# Patient Record
Sex: FEMALE | Race: OTHER | NOT HISPANIC OR LATINO
[De-identification: names, ages, dates, MRNs, and addresses within clinical notes are randomized per-mention and may not be internally consistent; named-entity substitution may affect disease eponyms.]

---

## 2018-04-09 PROBLEM — Z00.00 ENCOUNTER FOR PREVENTIVE HEALTH EXAMINATION: Status: ACTIVE | Noted: 2018-04-09

## 2018-05-29 ENCOUNTER — APPOINTMENT (OUTPATIENT)
Dept: UROLOGY | Facility: CLINIC | Age: 38
End: 2018-05-29

## 2018-08-20 ENCOUNTER — TRANSCRIPTION ENCOUNTER (OUTPATIENT)
Age: 38
End: 2018-08-20

## 2018-12-06 ENCOUNTER — APPOINTMENT (OUTPATIENT)
Dept: SURGERY | Facility: CLINIC | Age: 38
End: 2018-12-06
Payer: MEDICAID

## 2018-12-06 VITALS
WEIGHT: 115 LBS | BODY MASS INDEX: 19.16 KG/M2 | SYSTOLIC BLOOD PRESSURE: 128 MMHG | HEIGHT: 65 IN | DIASTOLIC BLOOD PRESSURE: 70 MMHG

## 2018-12-06 DIAGNOSIS — Z78.9 OTHER SPECIFIED HEALTH STATUS: ICD-10-CM

## 2018-12-06 DIAGNOSIS — Z80.3 FAMILY HISTORY OF MALIGNANT NEOPLASM OF BREAST: ICD-10-CM

## 2018-12-06 DIAGNOSIS — Z87.891 PERSONAL HISTORY OF NICOTINE DEPENDENCE: ICD-10-CM

## 2018-12-06 PROCEDURE — 99202 OFFICE O/P NEW SF 15 MIN: CPT

## 2018-12-07 PROBLEM — Z87.891 FORMER SMOKER: Status: ACTIVE | Noted: 2018-12-06

## 2018-12-07 PROBLEM — Z78.9 CAFFEINE USE: Status: ACTIVE | Noted: 2018-12-06

## 2018-12-07 PROBLEM — Z80.3 FAMILY HISTORY OF MALIGNANT NEOPLASM OF BREAST: Status: ACTIVE | Noted: 2018-12-06

## 2018-12-07 PROBLEM — Z78.9 SOCIAL ALCOHOL USE: Status: ACTIVE | Noted: 2018-12-06

## 2019-06-06 ENCOUNTER — APPOINTMENT (OUTPATIENT)
Dept: SURGERY | Facility: CLINIC | Age: 39
End: 2019-06-06
Payer: COMMERCIAL

## 2019-06-06 VITALS
HEIGHT: 65 IN | WEIGHT: 115 LBS | SYSTOLIC BLOOD PRESSURE: 112 MMHG | DIASTOLIC BLOOD PRESSURE: 64 MMHG | BODY MASS INDEX: 19.16 KG/M2

## 2019-06-06 DIAGNOSIS — N63.10 UNSPECIFIED LUMP IN THE RIGHT BREAST, UNSPECIFIED QUADRANT: ICD-10-CM

## 2019-06-06 DIAGNOSIS — N60.11 DIFFUSE CYSTIC MASTOPATHY OF RIGHT BREAST: ICD-10-CM

## 2019-06-06 PROCEDURE — 99212 OFFICE O/P EST SF 10 MIN: CPT

## 2019-06-06 NOTE — PLAN
[FreeTextEntry1] : Return to office in 6 months or sooner as needed.\par Bilateral mammogram to be done November 2019

## 2019-06-06 NOTE — PHYSICAL EXAM
[de-identified] : Asymmetry as before with right breast larger. No new masses or areas of suspicion noted bilaterally. Core biopsy site reaction in the right lateral breast has resolved. No axillary adenopathy bilaterally. [de-identified] : no adenopathy

## 2019-12-26 ENCOUNTER — APPOINTMENT (OUTPATIENT)
Dept: SURGERY | Facility: CLINIC | Age: 39
End: 2019-12-26

## 2021-02-26 ENCOUNTER — TRANSCRIPTION ENCOUNTER (OUTPATIENT)
Age: 41
End: 2021-02-26

## 2021-03-22 ENCOUNTER — TRANSCRIPTION ENCOUNTER (OUTPATIENT)
Age: 41
End: 2021-03-22

## 2022-04-29 ENCOUNTER — APPOINTMENT (OUTPATIENT)
Dept: NEUROLOGY | Facility: CLINIC | Age: 42
End: 2022-04-29

## 2023-02-28 ENCOUNTER — NON-APPOINTMENT (OUTPATIENT)
Age: 43
End: 2023-02-28

## 2023-02-28 ENCOUNTER — APPOINTMENT (OUTPATIENT)
Dept: OBGYN | Facility: CLINIC | Age: 43
End: 2023-02-28
Payer: COMMERCIAL

## 2023-02-28 VITALS
BODY MASS INDEX: 21.66 KG/M2 | SYSTOLIC BLOOD PRESSURE: 116 MMHG | HEIGHT: 65 IN | HEART RATE: 77 BPM | DIASTOLIC BLOOD PRESSURE: 82 MMHG | WEIGHT: 130 LBS

## 2023-02-28 DIAGNOSIS — Z87.19 PERSONAL HISTORY OF OTHER DISEASES OF THE DIGESTIVE SYSTEM: ICD-10-CM

## 2023-02-28 DIAGNOSIS — Z86.2 PERSONAL HISTORY OF DISEASES OF THE BLOOD AND BLOOD-FORMING ORGANS AND CERTAIN DISORDERS INVOLVING THE IMMUNE MECHANISM: ICD-10-CM

## 2023-02-28 PROCEDURE — 76830 TRANSVAGINAL US NON-OB: CPT

## 2023-02-28 PROCEDURE — 99203 OFFICE O/P NEW LOW 30 MIN: CPT | Mod: 25

## 2023-02-28 NOTE — PROCEDURE
[Transvaginal Ultrasound] : transvaginal ultrasound [FreeTextEntry3] : SNOWSTORM PATTERN, ADENOMYOSIS [FreeTextEntry5] : 116 CC VOLUME,  THICKENED ENDOMETRIUM 5 MM [FreeTextEntry7] : 3.4 CC [FreeTextEntry8] : 3.8 CC

## 2023-02-28 NOTE — HISTORY OF PRESENT ILLNESS
[[place]] : intercourse [Irregular Menstrual Interval] : irregular menstrual interval [Abnormal Quantity] : abnormal quantity [Abnormal Duration] : abnormal duration [Heavy Bleeding] : heavy bleeding [PGHxTotal] : 2 [[facility] [[facility] : 2 [FreeTextEntry1] : /...2 MARGARITA'S    AMARJIT AND  ARABELLA

## 2023-03-01 ENCOUNTER — NON-APPOINTMENT (OUTPATIENT)
Age: 43
End: 2023-03-01

## 2023-03-10 ENCOUNTER — NON-APPOINTMENT (OUTPATIENT)
Age: 43
End: 2023-03-10

## 2023-03-17 ENCOUNTER — NON-APPOINTMENT (OUTPATIENT)
Age: 43
End: 2023-03-17

## 2023-03-21 ENCOUNTER — APPOINTMENT (OUTPATIENT)
Dept: OBGYN | Facility: CLINIC | Age: 43
End: 2023-03-21
Payer: COMMERCIAL

## 2023-03-21 ENCOUNTER — NON-APPOINTMENT (OUTPATIENT)
Age: 43
End: 2023-03-21

## 2023-03-21 VITALS
TEMPERATURE: 97.9 F | WEIGHT: 130 LBS | HEIGHT: 65 IN | DIASTOLIC BLOOD PRESSURE: 81 MMHG | SYSTOLIC BLOOD PRESSURE: 115 MMHG | HEART RATE: 70 BPM | BODY MASS INDEX: 21.66 KG/M2

## 2023-03-21 DIAGNOSIS — Z01.818 ENCOUNTER FOR OTHER PREPROCEDURAL EXAMINATION: ICD-10-CM

## 2023-03-21 LAB
HCG UR QL: NEGATIVE
QUALITY CONTROL: YES

## 2023-03-21 PROCEDURE — 58558Z: CUSTOM

## 2023-03-21 PROCEDURE — 81025 URINE PREGNANCY TEST: CPT

## 2023-03-21 PROCEDURE — 58300 INSERT INTRAUTERINE DEVICE: CPT

## 2023-03-21 NOTE — PROCEDURE
[IUD Placement] : intrauterine device (IUD) placement [Pain] : pain [Expulsion] : expulsion [Failure] : failure [Uterine Perforation] : uterine perforation [Neg Pregnancy Test] : negative pregnancy test [Betadine] : Betadine [Tenaculum] : Tenaculum [Easy Passage] : Easy passage [Mirena IUD] : Mirena IUD [No Complications] : No complications [Hysteroscopy] : Hysteroscopy [Time out performed] : Pre-procedure time out performed.  Patient's name, date of birth and procedure confirmed. [Consent Obtained] : Consent obtained [Risks] : risks [Benefits] : benefits [Alternatives] : alternatives [Patient] : patient [Infection] : infection [Bleeding] : bleeding [Allergic Reaction] : allergic reaction [Sent to Pathology] : specimen was placed in buffered formalin and sent for pathology [Hemostasis obtained] : hemostasis obtained [Tolerated Well] : Patient tolerated the procedure well [Aftercare instructions/regstrictions given and follow-up scheduled] : Aftercare instructions/restrictions given and follow-up scheduled [Antibiotics given] : antibiotics not given [de-identified] : [id number] [de-identified] : 01/2025 [de-identified] : 2039 [de-identified] : first degree prolapse\par sounded to 10cm\par moderate polypoid tissue\par medium sized curette\par both ostia visualized\par MIRENA INSERTED

## 2023-03-22 ENCOUNTER — NON-APPOINTMENT (OUTPATIENT)
Age: 43
End: 2023-03-22

## 2023-03-25 LAB — CORE LAB BIOPSY: NORMAL

## 2023-03-27 ENCOUNTER — NON-APPOINTMENT (OUTPATIENT)
Age: 43
End: 2023-03-27

## 2023-04-04 ENCOUNTER — APPOINTMENT (OUTPATIENT)
Dept: OBGYN | Facility: CLINIC | Age: 43
End: 2023-04-04
Payer: COMMERCIAL

## 2023-04-04 VITALS
BODY MASS INDEX: 21.66 KG/M2 | HEART RATE: 76 BPM | DIASTOLIC BLOOD PRESSURE: 81 MMHG | WEIGHT: 130 LBS | SYSTOLIC BLOOD PRESSURE: 112 MMHG | HEIGHT: 65 IN

## 2023-04-04 PROCEDURE — 76830 TRANSVAGINAL US NON-OB: CPT

## 2023-04-04 PROCEDURE — 99213 OFFICE O/P EST LOW 20 MIN: CPT | Mod: 25

## 2023-04-04 NOTE — REVIEW OF SYSTEMS
[Fatigue] : fatigue [Recent Weight Gain (___ Lbs)] : recent [unfilled] ~Ulb weight gain [Nausea] : nausea [Negative] : Breast

## 2023-04-04 NOTE — PROCEDURE
[Transvaginal Ultrasound] : transvaginal ultrasound [FreeTextEntry3] : CERVIX NORMAL\par NO FREE FLUID [FreeTextEntry5] : 105 CC VOL,    1.7 MM..MIRENA IN PROPER POSITION [FreeTextEntry7] : 6.6 CC [FreeTextEntry8] : 5.4 CC

## 2023-07-19 ENCOUNTER — APPOINTMENT (OUTPATIENT)
Dept: OBGYN | Facility: CLINIC | Age: 43
End: 2023-07-19
Payer: COMMERCIAL

## 2023-07-19 VITALS
HEART RATE: 73 BPM | WEIGHT: 131 LBS | BODY MASS INDEX: 21.83 KG/M2 | SYSTOLIC BLOOD PRESSURE: 121 MMHG | HEIGHT: 65 IN | DIASTOLIC BLOOD PRESSURE: 82 MMHG

## 2023-07-19 DIAGNOSIS — N80.0 ENDOMETRIOSIS OF UTERUS: ICD-10-CM

## 2023-07-19 DIAGNOSIS — N93.9 ABNORMAL UTERINE AND VAGINAL BLEEDING, UNSPECIFIED: ICD-10-CM

## 2023-07-19 PROCEDURE — 99215 OFFICE O/P EST HI 40 MIN: CPT | Mod: 25

## 2023-07-19 PROCEDURE — 76830 TRANSVAGINAL US NON-OB: CPT

## 2023-07-19 PROCEDURE — 99214 OFFICE O/P EST MOD 30 MIN: CPT | Mod: 25

## 2023-07-19 NOTE — HISTORY OF PRESENT ILLNESS
[Irregular Menstrual Interval] : irregular menstrual interval [Light Bleeding] : light bleeding [FreeTextEntry1] : s/p mirena with sampling 3 mths ago for adenomyosis/aub/anemia\par heavy bleeding and cramping gone...but she has a constant unpredictable bloody discharge that is interfering with her life quality.

## 2023-07-19 NOTE — PROCEDURE
[Abnormal Uterine Bleeding] : abnormal uterine bleeding [Transvaginal Ultrasound] : transvaginal ultrasound [FreeTextEntry3] : uterus slightly larger...from 106 cc-126 cc\par mirena in pplace\par cervix normal\par no free fluid [FreeTextEntry5] : 126 cc,   3.6 mm [FreeTextEntry7] : 10.5 cc [FreeTextEntry8] : 9.4 cc

## 2023-07-19 NOTE — REVIEW OF SYSTEMS
"Chief Complaint   Patient presents with     Weight Check       Initial Temp 98.4  F (36.9  C) (Tympanic)   Ht 1' 8\" (0.508 m)   Wt 6 lb 13 oz (3.09 kg)   HC 13.25\" (33.7 cm)   BMI 11.97 kg/m   Estimated body mass index is 11.97 kg/m  as calculated from the following:    Height as of this encounter: 1' 8\" (0.508 m).    Weight as of this encounter: 6 lb 13 oz (3.09 kg).    Krystle Vigil CMA    " [Abn Vaginal bleeding] : abnormal vaginal bleeding [Negative] : Breast [Fatigue] : no fatigue [Recent Weight Gain (___ Lbs)] : no recent weight gain [Nausea] : no nausea

## 2023-07-31 ENCOUNTER — APPOINTMENT (OUTPATIENT)
Dept: ORTHOPEDIC SURGERY | Facility: CLINIC | Age: 43
End: 2023-07-31

## 2023-08-01 ENCOUNTER — NON-APPOINTMENT (OUTPATIENT)
Age: 43
End: 2023-08-01

## 2023-08-01 DIAGNOSIS — D68.0 VON WILLEBRAND'S DISEASE: ICD-10-CM

## 2023-08-10 ENCOUNTER — NON-APPOINTMENT (OUTPATIENT)
Age: 43
End: 2023-08-10

## 2023-08-31 ENCOUNTER — NON-APPOINTMENT (OUTPATIENT)
Age: 43
End: 2023-08-31

## 2023-08-31 ENCOUNTER — OUTPATIENT (OUTPATIENT)
Dept: OUTPATIENT SERVICES | Facility: HOSPITAL | Age: 43
LOS: 1 days | End: 2023-08-31
Payer: COMMERCIAL

## 2023-08-31 VITALS
SYSTOLIC BLOOD PRESSURE: 115 MMHG | RESPIRATION RATE: 17 BRPM | HEIGHT: 65 IN | TEMPERATURE: 99 F | HEART RATE: 65 BPM | OXYGEN SATURATION: 99 % | WEIGHT: 130.07 LBS | DIASTOLIC BLOOD PRESSURE: 77 MMHG

## 2023-08-31 DIAGNOSIS — N80.00 ENDOMETRIOSIS OF THE UTERUS, UNSPECIFIED: ICD-10-CM

## 2023-08-31 DIAGNOSIS — Z98.51 TUBAL LIGATION STATUS: Chronic | ICD-10-CM

## 2023-08-31 DIAGNOSIS — Z01.818 ENCOUNTER FOR OTHER PREPROCEDURAL EXAMINATION: ICD-10-CM

## 2023-08-31 DIAGNOSIS — Z90.89 ACQUIRED ABSENCE OF OTHER ORGANS: Chronic | ICD-10-CM

## 2023-08-31 DIAGNOSIS — Z98.890 OTHER SPECIFIED POSTPROCEDURAL STATES: Chronic | ICD-10-CM

## 2023-08-31 LAB
ALBUMIN SERPL ELPH-MCNC: 4.8 G/DL — SIGNIFICANT CHANGE UP (ref 3.5–5.2)
ALP SERPL-CCNC: 70 U/L — SIGNIFICANT CHANGE UP (ref 30–115)
ALT FLD-CCNC: 6 U/L — SIGNIFICANT CHANGE UP (ref 0–41)
ANION GAP SERPL CALC-SCNC: 11 MMOL/L — SIGNIFICANT CHANGE UP (ref 7–14)
APTT BLD: 35.1 SEC — SIGNIFICANT CHANGE UP (ref 27–39.2)
AST SERPL-CCNC: 13 U/L — SIGNIFICANT CHANGE UP (ref 0–41)
BASOPHILS # BLD AUTO: 0.02 K/UL — SIGNIFICANT CHANGE UP (ref 0–0.2)
BASOPHILS NFR BLD AUTO: 0.4 % — SIGNIFICANT CHANGE UP (ref 0–1)
BILIRUB SERPL-MCNC: <0.2 MG/DL — SIGNIFICANT CHANGE UP (ref 0.2–1.2)
BLD GP AB SCN SERPL QL: SIGNIFICANT CHANGE UP
BUN SERPL-MCNC: 9 MG/DL — LOW (ref 10–20)
CALCIUM SERPL-MCNC: 9.3 MG/DL — SIGNIFICANT CHANGE UP (ref 8.4–10.5)
CHLORIDE SERPL-SCNC: 101 MMOL/L — SIGNIFICANT CHANGE UP (ref 98–110)
CO2 SERPL-SCNC: 26 MMOL/L — SIGNIFICANT CHANGE UP (ref 17–32)
CREAT SERPL-MCNC: 0.6 MG/DL — LOW (ref 0.7–1.5)
EGFR: 115 ML/MIN/1.73M2 — SIGNIFICANT CHANGE UP
EOSINOPHIL # BLD AUTO: 0.14 K/UL — SIGNIFICANT CHANGE UP (ref 0–0.7)
EOSINOPHIL NFR BLD AUTO: 2.6 % — SIGNIFICANT CHANGE UP (ref 0–8)
GLUCOSE SERPL-MCNC: 72 MG/DL — SIGNIFICANT CHANGE UP (ref 70–99)
HCT VFR BLD CALC: 37.7 % — SIGNIFICANT CHANGE UP (ref 37–47)
HGB BLD-MCNC: 12.5 G/DL — SIGNIFICANT CHANGE UP (ref 12–16)
IMM GRANULOCYTES NFR BLD AUTO: 0.2 % — SIGNIFICANT CHANGE UP (ref 0.1–0.3)
INR BLD: 0.96 RATIO — SIGNIFICANT CHANGE UP (ref 0.65–1.3)
LYMPHOCYTES # BLD AUTO: 1.84 K/UL — SIGNIFICANT CHANGE UP (ref 1.2–3.4)
LYMPHOCYTES # BLD AUTO: 33.8 % — SIGNIFICANT CHANGE UP (ref 20.5–51.1)
MCHC RBC-ENTMCNC: 29.2 PG — SIGNIFICANT CHANGE UP (ref 27–31)
MCHC RBC-ENTMCNC: 33.2 G/DL — SIGNIFICANT CHANGE UP (ref 32–37)
MCV RBC AUTO: 88.1 FL — SIGNIFICANT CHANGE UP (ref 81–99)
MONOCYTES # BLD AUTO: 0.4 K/UL — SIGNIFICANT CHANGE UP (ref 0.1–0.6)
MONOCYTES NFR BLD AUTO: 7.4 % — SIGNIFICANT CHANGE UP (ref 1.7–9.3)
NEUTROPHILS # BLD AUTO: 3.03 K/UL — SIGNIFICANT CHANGE UP (ref 1.4–6.5)
NEUTROPHILS NFR BLD AUTO: 55.6 % — SIGNIFICANT CHANGE UP (ref 42.2–75.2)
NRBC # BLD: 0 /100 WBCS — SIGNIFICANT CHANGE UP (ref 0–0)
PLATELET # BLD AUTO: 164 K/UL — SIGNIFICANT CHANGE UP (ref 130–400)
PMV BLD: 10.2 FL — SIGNIFICANT CHANGE UP (ref 7.4–10.4)
POTASSIUM SERPL-MCNC: 4.1 MMOL/L — SIGNIFICANT CHANGE UP (ref 3.5–5)
POTASSIUM SERPL-SCNC: 4.1 MMOL/L — SIGNIFICANT CHANGE UP (ref 3.5–5)
PROT SERPL-MCNC: 7 G/DL — SIGNIFICANT CHANGE UP (ref 6–8)
PROTHROM AB SERPL-ACNC: 10.9 SEC — SIGNIFICANT CHANGE UP (ref 9.95–12.87)
RBC # BLD: 4.28 M/UL — SIGNIFICANT CHANGE UP (ref 4.2–5.4)
RBC # FLD: 13 % — SIGNIFICANT CHANGE UP (ref 11.5–14.5)
SODIUM SERPL-SCNC: 138 MMOL/L — SIGNIFICANT CHANGE UP (ref 135–146)
WBC # BLD: 5.44 K/UL — SIGNIFICANT CHANGE UP (ref 4.8–10.8)
WBC # FLD AUTO: 5.44 K/UL — SIGNIFICANT CHANGE UP (ref 4.8–10.8)

## 2023-08-31 PROCEDURE — 86901 BLOOD TYPING SEROLOGIC RH(D): CPT

## 2023-08-31 PROCEDURE — 93010 ELECTROCARDIOGRAM REPORT: CPT

## 2023-08-31 PROCEDURE — 99214 OFFICE O/P EST MOD 30 MIN: CPT | Mod: 25

## 2023-08-31 PROCEDURE — 80053 COMPREHEN METABOLIC PANEL: CPT

## 2023-08-31 PROCEDURE — 85610 PROTHROMBIN TIME: CPT

## 2023-08-31 PROCEDURE — 85025 COMPLETE CBC W/AUTO DIFF WBC: CPT

## 2023-08-31 PROCEDURE — 85730 THROMBOPLASTIN TIME PARTIAL: CPT

## 2023-08-31 PROCEDURE — 86900 BLOOD TYPING SEROLOGIC ABO: CPT

## 2023-08-31 PROCEDURE — 86850 RBC ANTIBODY SCREEN: CPT

## 2023-08-31 PROCEDURE — 93005 ELECTROCARDIOGRAM TRACING: CPT

## 2023-08-31 PROCEDURE — 36415 COLL VENOUS BLD VENIPUNCTURE: CPT

## 2023-08-31 NOTE — H&P PST ADULT - NSANTHOSAYNRD_GEN_A_CORE
No. FUENTES screening performed.  STOP BANG Legend: 0-2 = LOW Risk; 3-4 = INTERMEDIATE Risk; 5-8 = HIGH Risk

## 2023-08-31 NOTE — H&P PST ADULT - HISTORY OF PRESENT ILLNESS
PATIENT DENIES CHEST PAIN, SHORTNESS OF BREATH, PALPITATIONS, COUGHING, FEVER, DYSURIA.  CAN WALK UP 3-4 FLIGHTS OF STEPS WITHOUT SOB.    NO COUGH, FEVER, SORE THROAT, HEADACHE, LOSS OF TASTE OR SMELL. NO KNOWN EXPOSURE TO ANYONE WITH COVID. PATIENT WAS INSTRUCTED TO ISOLATE FROM NOW UNTIL THE SURGERY.    Anesthesia Alert  NO--Difficult Airway  NO--History of neck surgery or radiation  NO--Limited ROM of neck  NO--History of Malignant hyperthermia  NO--Personal or family history of Pseudocholinesterase deficiency  NO--Prior Anesthesia Complication  NO--Latex Allergy  NO--Loose teeth  NO--History of Rheumatoid Arthritis  NO--FEUNTES  NO--bleeding risk    [facility] Activity Status Index (DASI)  RESULT SUMMARY:  58.2 points  The higher the score (maximum 58.2), the higher the functional status.  9.89 METs  INPUTS:  Take care of self —> 2.75 = Yes  Walk indoors —> 1.75 = Yes  Walk 1&ndash;2 blocks on level ground —> 2.75 = Yes  Climb a flight of stairs or walk up a hill —> 5.5 = Yes  Run a short distance —> 8 = Yes  Do light work around the house —> 2.7 = Yes  Do moderate work around the house —> 3.5 = Yes  Do heavy work around the house —> 8 = Yes  Do yardwork —> 4.5 = Yes  Have sexual relations —> 5.25 = Yes  Participate in moderate recreational activities —> 6 = Yes  Participate in strenuous sports —> 7.5 = Yes    Revised Cardiac Risk Index for Pre-Operative Risk   RESULT SUMMARY:  0 points  Class I Risk  3.9 %  30-day risk of death, MI, or cardiac arrest  INPUTS:  Elevated-risk surgery —> 0 = No  History of ischemic heart disease —> 0 = No  History of congestive heart failure —> 0 = No  History of cerebrovascular disease —> 0 = No  Pre-operative treatment with insulin —> 0 = No  Pre-operative creatinine >2 mg/dL / 176.8 µmol/L —> 0 = No

## 2023-08-31 NOTE — H&P PST ADULT - CIGARETTE, PACK YRS
HPI:  Kareem Thompson is a 63y.o. year old female who returns to clinic today for follow up: Hypertension, hypercholesterolemia. Cardiovascular: She reports taking medications as instructed, no medication side effects noted, The home BP readings outside of office have been in the 120's / 70-80's range. .  Diet and Lifestyle: generally follows a low fat low cholesterol diet, generally follows a low sodium diet. exercise: walking, yoga. Current Outpatient Prescriptions   Medication Sig Dispense Refill    valsartan (DIOVAN) 160 mg tablet TAKE 1 TABLET BY MOUTH EVERY DAY 30 Tab 0    cetirizine (ZYRTEC) 10 mg tablet Take  by mouth daily. No Known Allergies  Social History   Substance Use Topics    Smoking status: Never Smoker    Smokeless tobacco: Never Used    Alcohol use No         Review of Systems   Constitutional: Negative for malaise/fatigue. Respiratory: Negative for shortness of breath. Cardiovascular: Negative for chest pain and leg swelling. Gastrointestinal: Negative for abdominal pain and heartburn. Neurological: Negative for dizziness and headaches. Physical Exam   Constitutional: She appears well-developed. No distress. /90 (BP 1 Location: Right arm, BP Patient Position: Sitting)  Pulse 82  Temp 98.3 °F (36.8 °C) (Oral)   Resp 12  Ht 5' 2.5\" (1.588 m)  Wt 161 lb 9.6 oz (73.3 kg)  LMP 07/19/2012  SpO2 96%  BMI 29.09 kg/m2   Neck: Carotid bruit is not present. Cardiovascular: Normal rate, regular rhythm, normal heart sounds and intact distal pulses. No murmur heard. Pulmonary/Chest: Effort normal and breath sounds normal. She has no wheezes. Abdominal: Soft. Bowel sounds are normal. She exhibits no distension and no mass. There is no tenderness. Musculoskeletal: She exhibits no edema. Psychiatric: She has a normal mood and affect. Vitals reviewed. Assessment & Plan:    ICD-10-CM ICD-9-CM    1.  Essential hypertension  Well-controlled continue current medication. I10 401.9    2. Hypercholesterolemia  Check lab work. Follow low-fat low-cholesterol diet. E78.00 272.0    3. Overweight Counseled regarding elevated BMI and current weight goals. Reviewed weight loss strategies including dietary changes and exercise. Follow-up Disposition:  Return in about 6 months (around 9/1/2018) for follow up. Advised her to call back or return to office if symptoms worsen/change/persist.  Discussed expected course/resolution/complications of diagnosis in detail with patient. Medication risks/benefits/costs/interactions/alternatives discussed with patient. She was given an after visit summary which includes diagnoses, current medications, & vitals. She expressed understanding with the diagnosis and plan. 20

## 2023-08-31 NOTE — H&P PST ADULT - REASON FOR ADMISSION
42 Y/O FEMALE HERE FOR PRE-ADMISSION SURGICAL TESTING. PATIENT REPORTS SHE HAS A H/O ENDOMETRIOSIS. SHE REPORTS CONTINUOUS BLEEDING X1 YR. SHE STATES SHE WAS DIAGNOSED WITH ENDOMETRIOSIS AND AN ENLARGED UTERUS.  NOW FOR SCHEDULED PELVIC EXAMINATION UNDER ANESTHESIA, ROBOTIC TOTAL HYSTERECTOMY.

## 2023-08-31 NOTE — H&P PST ADULT - NSICDXPASTSURGICALHX_GEN_ALL_CORE_FT
PAST SURGICAL HISTORY:  H/O hand surgery     H/O tubal ligation     S/P tonsillectomy and adenoidectomy

## 2023-08-31 NOTE — H&P PST ADULT - NSICDXFAMILYHX_GEN_ALL_CORE_FT
FAMILY HISTORY:  Father  Still living? Yes, Estimated age: Age Unknown  FH: Alzheimers disease, Age at diagnosis: Age Unknown  FH: HTN (hypertension), Age at diagnosis: Age Unknown

## 2023-09-01 DIAGNOSIS — N80.00 ENDOMETRIOSIS OF THE UTERUS, UNSPECIFIED: ICD-10-CM

## 2023-09-01 DIAGNOSIS — Z01.818 ENCOUNTER FOR OTHER PREPROCEDURAL EXAMINATION: ICD-10-CM

## 2023-09-05 ENCOUNTER — NON-APPOINTMENT (OUTPATIENT)
Age: 43
End: 2023-09-05

## 2023-09-21 ENCOUNTER — APPOINTMENT (OUTPATIENT)
Dept: OBGYN | Facility: HOSPITAL | Age: 43
End: 2023-09-21

## 2023-09-21 ENCOUNTER — TRANSCRIPTION ENCOUNTER (OUTPATIENT)
Age: 43
End: 2023-09-21

## 2023-09-21 ENCOUNTER — RESULT REVIEW (OUTPATIENT)
Age: 43
End: 2023-09-21

## 2023-09-21 ENCOUNTER — OUTPATIENT (OUTPATIENT)
Dept: OUTPATIENT SERVICES | Facility: HOSPITAL | Age: 43
LOS: 1 days | Discharge: HOME | End: 2023-09-21
Payer: COMMERCIAL

## 2023-09-21 VITALS
TEMPERATURE: 98 F | RESPIRATION RATE: 18 BRPM | OXYGEN SATURATION: 99 % | SYSTOLIC BLOOD PRESSURE: 109 MMHG | DIASTOLIC BLOOD PRESSURE: 59 MMHG | HEART RATE: 89 BPM

## 2023-09-21 VITALS
SYSTOLIC BLOOD PRESSURE: 109 MMHG | OXYGEN SATURATION: 100 % | TEMPERATURE: 98 F | WEIGHT: 130.07 LBS | HEIGHT: 65 IN | HEART RATE: 66 BPM | DIASTOLIC BLOOD PRESSURE: 63 MMHG | RESPIRATION RATE: 16 BRPM

## 2023-09-21 DIAGNOSIS — Z90.89 ACQUIRED ABSENCE OF OTHER ORGANS: Chronic | ICD-10-CM

## 2023-09-21 DIAGNOSIS — Z98.890 OTHER SPECIFIED POSTPROCEDURAL STATES: Chronic | ICD-10-CM

## 2023-09-21 DIAGNOSIS — Z98.51 TUBAL LIGATION STATUS: Chronic | ICD-10-CM

## 2023-09-21 DIAGNOSIS — N93.9 ABNORMAL UTERINE AND VAGINAL BLEEDING, UNSPECIFIED: ICD-10-CM

## 2023-09-21 DIAGNOSIS — N80.00 ENDOMETRIOSIS OF THE UTERUS, UNSPECIFIED: ICD-10-CM

## 2023-09-21 LAB — BLD GP AB SCN SERPL QL: SIGNIFICANT CHANGE UP

## 2023-09-21 PROCEDURE — 58572 TLH UTERUS OVER 250 G: CPT

## 2023-09-21 PROCEDURE — 58570 TLH UTERUS 250 G OR LESS: CPT

## 2023-09-21 PROCEDURE — C1889: CPT

## 2023-09-21 PROCEDURE — 86901 BLOOD TYPING SEROLOGIC RH(D): CPT

## 2023-09-21 PROCEDURE — 86850 RBC ANTIBODY SCREEN: CPT

## 2023-09-21 PROCEDURE — 86900 BLOOD TYPING SEROLOGIC ABO: CPT

## 2023-09-21 PROCEDURE — S2900: CPT

## 2023-09-21 PROCEDURE — 36415 COLL VENOUS BLD VENIPUNCTURE: CPT

## 2023-09-21 PROCEDURE — C9399: CPT

## 2023-09-21 PROCEDURE — 88307 TISSUE EXAM BY PATHOLOGIST: CPT

## 2023-09-21 RX ORDER — APREPITANT 80 MG/1
40 CAPSULE ORAL ONCE
Refills: 0 | Status: DISCONTINUED | OUTPATIENT
Start: 2023-09-21 | End: 2023-09-21

## 2023-09-21 RX ORDER — PROCHLORPERAZINE MALEATE 5 MG
10 TABLET ORAL ONCE
Refills: 0 | Status: COMPLETED | OUTPATIENT
Start: 2023-09-21 | End: 2023-09-21

## 2023-09-21 RX ORDER — HYDROMORPHONE HYDROCHLORIDE 2 MG/ML
0.5 INJECTION INTRAMUSCULAR; INTRAVENOUS; SUBCUTANEOUS
Refills: 0 | Status: DISCONTINUED | OUTPATIENT
Start: 2023-09-21 | End: 2023-09-21

## 2023-09-21 RX ORDER — ONDANSETRON 8 MG/1
4 TABLET, FILM COATED ORAL ONCE
Refills: 0 | Status: COMPLETED | OUTPATIENT
Start: 2023-09-21 | End: 2023-09-21

## 2023-09-21 RX ORDER — SODIUM CHLORIDE 9 MG/ML
1000 INJECTION, SOLUTION INTRAVENOUS
Refills: 0 | Status: DISCONTINUED | OUTPATIENT
Start: 2023-09-21 | End: 2023-09-21

## 2023-09-21 RX ORDER — ACETAMINOPHEN 500 MG
650 TABLET ORAL EVERY 6 HOURS
Refills: 0 | Status: DISCONTINUED | OUTPATIENT
Start: 2023-09-21 | End: 2023-09-21

## 2023-09-21 RX ORDER — HYDROMORPHONE HYDROCHLORIDE 2 MG/ML
1 INJECTION INTRAMUSCULAR; INTRAVENOUS; SUBCUTANEOUS
Refills: 0 | Status: DISCONTINUED | OUTPATIENT
Start: 2023-09-21 | End: 2023-09-21

## 2023-09-21 RX ADMIN — SODIUM CHLORIDE 1000 MILLILITER(S): 9 INJECTION, SOLUTION INTRAVENOUS at 23:02

## 2023-09-21 RX ADMIN — SODIUM CHLORIDE 100 MILLILITER(S): 9 INJECTION, SOLUTION INTRAVENOUS at 21:00

## 2023-09-21 RX ADMIN — ONDANSETRON 4 MILLIGRAM(S): 8 TABLET, FILM COATED ORAL at 21:00

## 2023-09-21 RX ADMIN — Medication 104 MILLIGRAM(S): at 22:58

## 2023-09-21 RX ADMIN — Medication 650 MILLIGRAM(S): at 23:12

## 2023-09-21 NOTE — CHART NOTE - NSCHARTNOTEFT_GEN_A_CORE
PACU ANESTHESIA ADMISSION NOTE      Procedure: Laparoscopic hysterectomy, total, uterus 250 g or less    Laparoscopy, robot-assisted, using da Esme Xi      Post op diagnosis:  Abnormal uterine bleeding due to adenomyosis        ____  Intubated  TV:______       Rate: ______      FiO2: ______    _x___  Patent Airway    _x___  Full return of protective reflexes    ___  Full recovery from anesthesia / back to baseline status    Vitals:            T:  97.8              BP :   120/71             R: 16             Sat: 99%              P:  91      Mental Status:  _x___ Awake   _____ Alert   _____ Drowsy   _____ Sedated    Nausea/Vomiting:  _x___  NO       ______Yes,   See Post - Op Orders         Pain Scale (0-10):  __0___    Treatment: __ None    __x__ See Post - Op/PCA Orders    Post - Operative Fluids:   __x__ Oral   ____ See Post - Op Orders    Plan: Discharge:   _x___Home       _____Floor     _____Critical Care    _____  Other:_________________    Comments:  No anesthesia issues or complications noted.  Discharge when criteria met.

## 2023-09-21 NOTE — BRIEF OPERATIVE NOTE - NSICDXBRIEFPREOP_GEN_ALL_CORE_FT
PRE-OP DIAGNOSIS:  Abnormal uterine bleeding due to adenomyosis 21-Sep-2023 19:29:32  Fatimah Hooper

## 2023-09-21 NOTE — ASU DISCHARGE PLAN (ADULT/PEDIATRIC) - CARE PROVIDER_API CALL
Damien Lauren  Obstetrics and Gynecology  [facility] [address]  Phone: [phone]  Fax: [phone]  Follow Up Time:

## 2023-09-21 NOTE — BRIEF OPERATIVE NOTE - NSICDXBRIEFPOSTOP_GEN_ALL_CORE_FT
POST-OP DIAGNOSIS:  Abnormal uterine bleeding due to adenomyosis 21-Sep-2023 19:29:35  Fatimah Hooper

## 2023-09-21 NOTE — BRIEF OPERATIVE NOTE - OPERATION/FINDINGS
Normal appearing external genitalia. Normal cervix. Adenomyotic appearing uterus. Normal ovaries bilaterally. Surgically absent fallopian tubes bilaterally.

## 2023-09-21 NOTE — BRIEF OPERATIVE NOTE - NSICDXBRIEFPROCEDURE_GEN_ALL_CORE_FT
PROCEDURES:  Laparoscopic hysterectomy, total, uterus 250 g or less 21-Sep-2023 19:29:17  Fatimah Hooper  Laparoscopy, robot-assisted, using da Esme Xi 21-Sep-2023 19:29:23  Fatimah Hooper

## 2023-09-25 ENCOUNTER — NON-APPOINTMENT (OUTPATIENT)
Age: 43
End: 2023-09-25

## 2023-09-25 LAB — SURGICAL PATHOLOGY STUDY: SIGNIFICANT CHANGE UP

## 2023-09-28 DIAGNOSIS — Z87.891 PERSONAL HISTORY OF NICOTINE DEPENDENCE: ICD-10-CM

## 2023-09-28 DIAGNOSIS — N88.8 OTHER SPECIFIED NONINFLAMMATORY DISORDERS OF CERVIX UTERI: ICD-10-CM

## 2023-09-28 DIAGNOSIS — N80.03 ADENOMYOSIS OF THE UTERUS: ICD-10-CM

## 2023-09-28 DIAGNOSIS — N72 INFLAMMATORY DISEASE OF CERVIX UTERI: ICD-10-CM

## 2023-10-03 ENCOUNTER — APPOINTMENT (OUTPATIENT)
Dept: OBGYN | Facility: CLINIC | Age: 43
End: 2023-10-03
Payer: COMMERCIAL

## 2023-10-03 VITALS
SYSTOLIC BLOOD PRESSURE: 121 MMHG | BODY MASS INDEX: 21.83 KG/M2 | DIASTOLIC BLOOD PRESSURE: 77 MMHG | HEIGHT: 65 IN | WEIGHT: 131 LBS | HEART RATE: 82 BPM

## 2023-10-03 PROCEDURE — 99024 POSTOP FOLLOW-UP VISIT: CPT

## 2023-10-13 ENCOUNTER — NON-APPOINTMENT (OUTPATIENT)
Age: 43
End: 2023-10-13

## 2023-10-13 DIAGNOSIS — N64.4 MASTODYNIA: ICD-10-CM

## 2023-10-24 ENCOUNTER — APPOINTMENT (OUTPATIENT)
Dept: OBGYN | Facility: CLINIC | Age: 43
End: 2023-10-24

## 2023-11-01 ENCOUNTER — APPOINTMENT (OUTPATIENT)
Dept: OBGYN | Facility: CLINIC | Age: 43
End: 2023-11-01
Payer: COMMERCIAL

## 2023-11-01 ENCOUNTER — NON-APPOINTMENT (OUTPATIENT)
Age: 43
End: 2023-11-01

## 2023-11-01 VITALS
HEIGHT: 65 IN | SYSTOLIC BLOOD PRESSURE: 126 MMHG | BODY MASS INDEX: 21.83 KG/M2 | WEIGHT: 131 LBS | DIASTOLIC BLOOD PRESSURE: 85 MMHG | HEART RATE: 84 BPM

## 2023-11-01 DIAGNOSIS — B37.9 CANDIDIASIS, UNSPECIFIED: ICD-10-CM

## 2023-11-01 PROBLEM — Z87.891 PERSONAL HISTORY OF NICOTINE DEPENDENCE: Chronic | Status: ACTIVE | Noted: 2023-08-31

## 2023-11-01 PROBLEM — N80.9 ENDOMETRIOSIS, UNSPECIFIED: Chronic | Status: ACTIVE | Noted: 2023-08-31

## 2023-11-01 PROCEDURE — 99024 POSTOP FOLLOW-UP VISIT: CPT

## 2023-11-01 RX ORDER — NAPROXEN SODIUM 550 MG/1
550 TABLET ORAL
Qty: 30 | Refills: 1 | Status: COMPLETED | COMMUNITY
Start: 2023-04-04 | End: 2023-11-01

## 2023-11-01 RX ORDER — CLOTRIMAZOLE AND BETAMETHASONE DIPROPIONATE 10; .5 MG/G; MG/G
1-0.05 CREAM TOPICAL TWICE DAILY
Qty: 1 | Refills: 3 | Status: ACTIVE | COMMUNITY
Start: 2023-11-01 | End: 1900-01-01

## 2023-11-01 RX ORDER — NORETHINDRONE ACETATE 5 MG/1
5 TABLET ORAL
Qty: 90 | Refills: 1 | Status: COMPLETED | COMMUNITY
Start: 2023-02-28 | End: 2023-11-01

## 2023-11-22 ENCOUNTER — APPOINTMENT (OUTPATIENT)
Dept: OBGYN | Facility: CLINIC | Age: 43
End: 2023-11-22
Payer: COMMERCIAL

## 2023-11-22 VITALS
HEIGHT: 65 IN | DIASTOLIC BLOOD PRESSURE: 65 MMHG | BODY MASS INDEX: 21.66 KG/M2 | SYSTOLIC BLOOD PRESSURE: 98 MMHG | HEART RATE: 68 BPM | WEIGHT: 130 LBS

## 2023-11-22 DIAGNOSIS — Z98.890 OTHER SPECIFIED POSTPROCEDURAL STATES: ICD-10-CM

## 2023-11-22 LAB
BILIRUB UR QL STRIP: NEGATIVE
CLARITY UR: CLEAR
COLLECTION METHOD: NORMAL
GLUCOSE UR-MCNC: NEGATIVE
HCG UR QL: 0.2 EU/DL
HGB UR QL STRIP.AUTO: NORMAL
KETONES UR-MCNC: NEGATIVE
LEUKOCYTE ESTERASE UR QL STRIP: NEGATIVE
NITRITE UR QL STRIP: NEGATIVE
PH UR STRIP: 5.5
PROT UR STRIP-MCNC: NEGATIVE
SP GR UR STRIP: >=1.03

## 2023-11-22 PROCEDURE — 99024 POSTOP FOLLOW-UP VISIT: CPT

## 2023-11-22 PROCEDURE — 81003 URINALYSIS AUTO W/O SCOPE: CPT | Mod: QW

## 2024-05-10 ENCOUNTER — APPOINTMENT (OUTPATIENT)
Dept: OBGYN | Facility: CLINIC | Age: 44
End: 2024-05-10
Payer: COMMERCIAL

## 2024-05-10 ENCOUNTER — RESULT CHARGE (OUTPATIENT)
Age: 44
End: 2024-05-10

## 2024-05-10 VITALS
SYSTOLIC BLOOD PRESSURE: 110 MMHG | BODY MASS INDEX: 21.99 KG/M2 | DIASTOLIC BLOOD PRESSURE: 67 MMHG | WEIGHT: 132 LBS | HEIGHT: 65 IN | HEART RATE: 85 BPM

## 2024-05-10 DIAGNOSIS — Z01.419 ENCOUNTER FOR GYNECOLOGICAL EXAMINATION (GENERAL) (ROUTINE) W/OUT ABNORMAL FINDINGS: ICD-10-CM

## 2024-05-10 LAB
BILIRUB UR QL STRIP: NEGATIVE
CLARITY UR: CLEAR
COLLECTION METHOD: NORMAL
GLUCOSE UR-MCNC: NEGATIVE
HCG UR QL: 0.2 EU/DL
HGB UR QL STRIP.AUTO: NEGATIVE
KETONES UR-MCNC: NEGATIVE
LEUKOCYTE ESTERASE UR QL STRIP: NEGATIVE
NITRITE UR QL STRIP: NEGATIVE
PH UR STRIP: 5.5
PROT UR STRIP-MCNC: NEGATIVE
SP GR UR STRIP: >=1.03

## 2024-05-10 PROCEDURE — 99396 PREV VISIT EST AGE 40-64: CPT

## 2024-05-10 PROCEDURE — 99459 PELVIC EXAMINATION: CPT

## 2024-05-10 NOTE — REVIEW OF SYSTEMS
[Fatigue] : no fatigue [Recent Weight Gain (___ Lbs)] : no recent weight gain [Nausea] : no nausea [Abn Vaginal bleeding] : abnormal vaginal bleeding [Negative] : Breast

## 2024-05-10 NOTE — PHYSICAL EXAM
[Chaperone Present] : A chaperone was present in the examining room during all aspects of the physical examination [[id number]] : A chaperone was present during the pelvic exam. [Examination Of The Breasts] : a normal appearance [No Masses] : no breast masses were palpable [Labia Majora] : normal [Labia Minora] : normal [Normal] : normal [Absent] : absent [Enlarged ___ wks] : not enlarged [Uterine Adnexae] : normal

## 2024-05-18 LAB
CYTOLOGY CVX/VAG DOC THIN PREP: NORMAL
HPV HIGH+LOW RISK DNA PNL CVX: NOT DETECTED

## 2024-07-15 ENCOUNTER — NON-APPOINTMENT (OUTPATIENT)
Age: 44
End: 2024-07-15

## 2024-07-15 ENCOUNTER — APPOINTMENT (OUTPATIENT)
Dept: OBGYN | Facility: CLINIC | Age: 44
End: 2024-07-15
Payer: COMMERCIAL

## 2024-07-15 VITALS
DIASTOLIC BLOOD PRESSURE: 64 MMHG | SYSTOLIC BLOOD PRESSURE: 109 MMHG | HEART RATE: 115 BPM | WEIGHT: 132 LBS | HEIGHT: 65 IN | BODY MASS INDEX: 21.99 KG/M2

## 2024-07-15 DIAGNOSIS — N83.292 OTHER OVARIAN CYST, LEFT SIDE: ICD-10-CM

## 2024-07-15 DIAGNOSIS — R10.2 PELVIC AND PERINEAL PAIN: ICD-10-CM

## 2024-07-15 PROCEDURE — 99459 PELVIC EXAMINATION: CPT

## 2024-07-15 PROCEDURE — 76830 TRANSVAGINAL US NON-OB: CPT

## 2024-07-15 PROCEDURE — 99203 OFFICE O/P NEW LOW 30 MIN: CPT | Mod: 25

## 2024-09-09 ENCOUNTER — APPOINTMENT (OUTPATIENT)
Dept: OBGYN | Facility: CLINIC | Age: 44
End: 2024-09-09
Payer: COMMERCIAL

## 2024-09-09 VITALS
BODY MASS INDEX: 21.99 KG/M2 | DIASTOLIC BLOOD PRESSURE: 80 MMHG | HEART RATE: 76 BPM | HEIGHT: 65 IN | SYSTOLIC BLOOD PRESSURE: 111 MMHG | WEIGHT: 132 LBS

## 2024-09-09 DIAGNOSIS — N83.292 OTHER OVARIAN CYST, LEFT SIDE: ICD-10-CM

## 2024-09-09 PROCEDURE — 76830 TRANSVAGINAL US NON-OB: CPT

## 2024-09-09 NOTE — PHYSICAL EXAM
[Chaperone Present] : A chaperone was present in the examining room during all aspects of the physical examination [[id number]] : A chaperone was present during the pelvic exam. [Appropriately responsive] : appropriately responsive [Alert] : alert [No Acute Distress] : no acute distress [Soft] : soft [Non-tender] : non-tender [Non-distended] : non-distended [Oriented x3] : oriented x3 [Labia Majora] : normal [Labia Minora] : normal [Normal] : normal [Absent] : absent [Uterine Adnexae] : normal  [Adnexa Tenderness On The Left] : tender

## 2024-09-09 NOTE — PROCEDURE
[Suspected Ovarian Cyst] : suspected ovarian cyst [F/U Abnormal US] : f/u abnormal ultrasound [Transvaginal Ultrasound] : transvaginal ultrasound [Absent] : absent [FreeTextEntry7] : 4.26cc [FreeTextEntry8] : 8.46cc with 9.0cc unilocular cyst [FreeTextEntry4] : slight increase in size, pt still ovulating

## 2024-09-09 NOTE — HISTORY OF PRESENT ILLNESS
[FreeTextEntry1] :  Pt presents today for a follow up to a previously seen  left_______ ovarian cyst feels LLQ discomfort once in a while HYST in 2023 [TextBox_102] : hyst 2023  ovaries in

## 2025-01-10 ENCOUNTER — APPOINTMENT (OUTPATIENT)
Dept: OBGYN | Facility: CLINIC | Age: 45
End: 2025-01-10
Payer: COMMERCIAL

## 2025-01-10 ENCOUNTER — NON-APPOINTMENT (OUTPATIENT)
Age: 45
End: 2025-01-10

## 2025-01-10 VITALS
BODY MASS INDEX: 21.99 KG/M2 | DIASTOLIC BLOOD PRESSURE: 80 MMHG | SYSTOLIC BLOOD PRESSURE: 121 MMHG | HEART RATE: 74 BPM | HEIGHT: 65 IN | WEIGHT: 132 LBS

## 2025-01-10 DIAGNOSIS — N83.292 OTHER OVARIAN CYST, LEFT SIDE: ICD-10-CM

## 2025-01-10 DIAGNOSIS — N90.89 OTHER SPECIFIED NONINFLAMMATORY DISORDERS OF VULVA AND PERINEUM: ICD-10-CM

## 2025-01-10 LAB
BILIRUB UR QL STRIP: NORMAL
CLARITY UR: CLEAR
COLLECTION METHOD: NORMAL
GLUCOSE UR-MCNC: NORMAL
HCG UR QL: 0.2 EU/DL
HGB UR QL STRIP.AUTO: NORMAL
KETONES UR-MCNC: NORMAL
LEUKOCYTE ESTERASE UR QL STRIP: NORMAL
NITRITE UR QL STRIP: NORMAL
PH UR STRIP: 5.5
PROT UR STRIP-MCNC: NORMAL
SP GR UR STRIP: 1.03

## 2025-01-10 PROCEDURE — 76830 TRANSVAGINAL US NON-OB: CPT

## 2025-01-10 PROCEDURE — 81003 URINALYSIS AUTO W/O SCOPE: CPT | Mod: QW

## 2025-01-10 PROCEDURE — 99213 OFFICE O/P EST LOW 20 MIN: CPT | Mod: 25

## 2025-01-10 PROCEDURE — 99459 PELVIC EXAMINATION: CPT

## 2025-02-18 ENCOUNTER — APPOINTMENT (OUTPATIENT)
Dept: OBGYN | Facility: CLINIC | Age: 45
End: 2025-02-18
Payer: COMMERCIAL

## 2025-02-18 VITALS
BODY MASS INDEX: 21.39 KG/M2 | SYSTOLIC BLOOD PRESSURE: 116 MMHG | WEIGHT: 128.4 LBS | DIASTOLIC BLOOD PRESSURE: 85 MMHG | TEMPERATURE: 98.3 F | HEIGHT: 65 IN | HEART RATE: 64 BPM

## 2025-02-18 DIAGNOSIS — N90.89 OTHER SPECIFIED NONINFLAMMATORY DISORDERS OF VULVA AND PERINEUM: ICD-10-CM

## 2025-02-18 PROCEDURE — 56821 COLPOSCOPY VULVA W/BIOPSY: CPT

## 2025-02-19 ENCOUNTER — NON-APPOINTMENT (OUTPATIENT)
Age: 45
End: 2025-02-19

## 2025-03-01 LAB — CORE LAB BIOPSY: NORMAL

## 2025-03-04 ENCOUNTER — TRANSCRIPTION ENCOUNTER (OUTPATIENT)
Age: 45
End: 2025-03-04

## 2025-03-04 ENCOUNTER — APPOINTMENT (OUTPATIENT)
Dept: OBGYN | Facility: CLINIC | Age: 45
End: 2025-03-04
Payer: COMMERCIAL

## 2025-03-04 VITALS — HEART RATE: 75 BPM | SYSTOLIC BLOOD PRESSURE: 108 MMHG | DIASTOLIC BLOOD PRESSURE: 90 MMHG | HEIGHT: 65 IN

## 2025-03-04 DIAGNOSIS — R87.69 ABNORMAL CYTOLOGICAL FINDINGS IN SPECIMENS FROM OTHER FEMALE GENITAL ORGANS: ICD-10-CM

## 2025-03-04 PROCEDURE — 99213 OFFICE O/P EST LOW 20 MIN: CPT

## 2025-03-04 PROCEDURE — 99459 PELVIC EXAMINATION: CPT

## 2025-03-18 ENCOUNTER — APPOINTMENT (OUTPATIENT)
Dept: OBGYN | Facility: CLINIC | Age: 45
End: 2025-03-18
Payer: COMMERCIAL

## 2025-03-18 DIAGNOSIS — R87.69 ABNORMAL CYTOLOGICAL FINDINGS IN SPECIMENS FROM OTHER FEMALE GENITAL ORGANS: ICD-10-CM

## 2025-03-18 PROCEDURE — 11403 EXC TR-EXT B9+MARG 2.1-3CM: CPT

## 2025-03-19 ENCOUNTER — NON-APPOINTMENT (OUTPATIENT)
Age: 45
End: 2025-03-19

## 2025-04-01 ENCOUNTER — APPOINTMENT (OUTPATIENT)
Dept: OBGYN | Facility: CLINIC | Age: 45
End: 2025-04-01
Payer: COMMERCIAL

## 2025-04-01 VITALS
SYSTOLIC BLOOD PRESSURE: 119 MMHG | BODY MASS INDEX: 21.66 KG/M2 | DIASTOLIC BLOOD PRESSURE: 87 MMHG | HEART RATE: 86 BPM | WEIGHT: 130 LBS | HEIGHT: 65 IN

## 2025-04-01 DIAGNOSIS — R87.69 ABNORMAL CYTOLOGICAL FINDINGS IN SPECIMENS FROM OTHER FEMALE GENITAL ORGANS: ICD-10-CM

## 2025-04-01 PROCEDURE — 99459 PELVIC EXAMINATION: CPT

## 2025-04-01 PROCEDURE — 99213 OFFICE O/P EST LOW 20 MIN: CPT

## 2025-05-03 ENCOUNTER — EMERGENCY (EMERGENCY)
Facility: HOSPITAL | Age: 45
LOS: 0 days | Discharge: HOME | End: 2025-05-03
Attending: EMERGENCY MEDICINE
Payer: COMMERCIAL

## 2025-05-03 VITALS
SYSTOLIC BLOOD PRESSURE: 112 MMHG | HEART RATE: 89 BPM | DIASTOLIC BLOOD PRESSURE: 70 MMHG | RESPIRATION RATE: 16 BRPM | OXYGEN SATURATION: 100 %

## 2025-05-03 VITALS
TEMPERATURE: 97 F | HEART RATE: 137 BPM | DIASTOLIC BLOOD PRESSURE: 67 MMHG | OXYGEN SATURATION: 100 % | SYSTOLIC BLOOD PRESSURE: 110 MMHG | RESPIRATION RATE: 30 BRPM | WEIGHT: 136.03 LBS

## 2025-05-03 DIAGNOSIS — R06.4 HYPERVENTILATION: ICD-10-CM

## 2025-05-03 DIAGNOSIS — R07.89 OTHER CHEST PAIN: ICD-10-CM

## 2025-05-03 DIAGNOSIS — R20.2 PARESTHESIA OF SKIN: ICD-10-CM

## 2025-05-03 DIAGNOSIS — Z91.018 ALLERGY TO OTHER FOODS: ICD-10-CM
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
## 2025-05-03 DIAGNOSIS — R21 RASH AND OTHER NONSPECIFIC SKIN ERUPTION: ICD-10-CM

## 2025-05-03 DIAGNOSIS — R07.0 PAIN IN THROAT: ICD-10-CM

## 2025-05-03 DIAGNOSIS — Z88.1 ALLERGY STATUS TO OTHER ANTIBIOTIC AGENTS: ICD-10-CM

## 2025-05-03 DIAGNOSIS — T78.40XA ALLERGY, UNSPECIFIED, INITIAL ENCOUNTER: ICD-10-CM

## 2025-05-03 DIAGNOSIS — Z91.09 OTHER ALLERGY STATUS, OTHER THAN TO DRUGS AND BIOLOGICAL SUBSTANCES: ICD-10-CM

## 2025-05-03 DIAGNOSIS — Z98.51 TUBAL LIGATION STATUS: Chronic | ICD-10-CM

## 2025-05-03 DIAGNOSIS — Z90.89 ACQUIRED ABSENCE OF OTHER ORGANS: Chronic | ICD-10-CM
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
## 2025-05-03 DIAGNOSIS — Z98.890 OTHER SPECIFIED POSTPROCEDURAL STATES: Chronic | ICD-10-CM

## 2025-05-03 PROCEDURE — 99283 EMERGENCY DEPT VISIT LOW MDM: CPT

## 2025-05-03 PROCEDURE — 99284 EMERGENCY DEPT VISIT MOD MDM: CPT

## 2025-05-03 RX ORDER — PREDNISONE 20 MG/1
50 TABLET ORAL ONCE
Refills: 0 | Status: COMPLETED | OUTPATIENT
Start: 2025-05-03 | End: 2025-05-03

## 2025-05-03 RX ADMIN — Medication 10 MILLIGRAM(S): at 14:53

## 2025-05-03 RX ADMIN — Medication 20 MILLIGRAM(S): at 13:14

## 2025-05-03 RX ADMIN — PREDNISONE 50 MILLIGRAM(S): 20 TABLET ORAL at 13:13

## 2025-05-14 ENCOUNTER — APPOINTMENT (OUTPATIENT)
Dept: OBGYN | Facility: CLINIC | Age: 45
End: 2025-05-14

## 2025-07-23 NOTE — BRIEF OPERATIVE NOTE - DISPOSITION
PACU --> Home Continue Regimen: spironolactone 100 mg tablet QD\\nTwyneo 0.1 %-3 % topical cream Qhs\\nPatient declines refills today Detail Level: Detailed Plan: Follow up in 3 months Render In Strict Bullet Format?: No Initiate Treatment: Jublia 10 % topical solution with applicator Qd\\nQuantity: 8.0 ml  Days Supply: 30\\nSig: Apply thin layer once daily to the affected nails. Detail Level: Zone Plan: Follow up in three months

## 2025-09-02 ENCOUNTER — NON-APPOINTMENT (OUTPATIENT)
Age: 45
End: 2025-09-02

## 2025-09-12 ENCOUNTER — APPOINTMENT (OUTPATIENT)
Dept: OBGYN | Facility: CLINIC | Age: 45
End: 2025-09-12
Payer: COMMERCIAL

## 2025-09-12 VITALS
HEIGHT: 65 IN | BODY MASS INDEX: 22.49 KG/M2 | SYSTOLIC BLOOD PRESSURE: 118 MMHG | HEART RATE: 76 BPM | DIASTOLIC BLOOD PRESSURE: 78 MMHG | WEIGHT: 135 LBS

## 2025-09-12 DIAGNOSIS — N95.1 MENOPAUSAL AND FEMALE CLIMACTERIC STATES: ICD-10-CM

## 2025-09-12 DIAGNOSIS — Z01.419 ENCOUNTER FOR GYNECOLOGICAL EXAMINATION (GENERAL) (ROUTINE) W/OUT ABNORMAL FINDINGS: ICD-10-CM

## 2025-09-12 DIAGNOSIS — R10.2 PELVIC AND PERINEAL PAIN: ICD-10-CM

## 2025-09-12 LAB
BILIRUB UR QL STRIP: NEGATIVE
CLARITY UR: CLEAR
COLLECTION METHOD: NORMAL
GLUCOSE UR-MCNC: NEGATIVE
HCG UR QL: 0.2 EU/DL
HGB UR QL STRIP.AUTO: NEGATIVE
KETONES UR-MCNC: NEGATIVE
LEUKOCYTE ESTERASE UR QL STRIP: NEGATIVE
NITRITE UR QL STRIP: NEGATIVE
PH UR STRIP: 7.5
PROT UR STRIP-MCNC: NEGATIVE
SP GR UR STRIP: 1.02

## 2025-09-12 PROCEDURE — 99396 PREV VISIT EST AGE 40-64: CPT | Mod: 25

## 2025-09-12 PROCEDURE — 99459 PELVIC EXAMINATION: CPT | Mod: NC

## 2025-09-12 PROCEDURE — 81003 URINALYSIS AUTO W/O SCOPE: CPT | Mod: QW

## 2025-09-12 PROCEDURE — 76830 TRANSVAGINAL US NON-OB: CPT

## 2025-09-21 LAB — HPV HIGH+LOW RISK DNA PNL CVX: NOT DETECTED
